# Patient Record
Sex: MALE | Race: WHITE | URBAN - METROPOLITAN AREA
[De-identification: names, ages, dates, MRNs, and addresses within clinical notes are randomized per-mention and may not be internally consistent; named-entity substitution may affect disease eponyms.]

---

## 2021-06-07 ENCOUNTER — VIRTUAL VISIT (OUTPATIENT)
Dept: INFECTIOUS DISEASES | Age: 81
End: 2021-06-07
Payer: MEDICARE

## 2021-06-07 DIAGNOSIS — I96 GANGRENE OF LEFT FOOT (HCC): Primary | ICD-10-CM

## 2021-06-07 PROCEDURE — 99213 OFFICE O/P EST LOW 20 MIN: CPT | Performed by: INTERNAL MEDICINE

## 2021-06-07 NOTE — PROGRESS NOTES
Caesar Esquivel (:  1940) is a 79 y.o. male,Established patient, here for evaluation of the following chief complaint(s): No chief complaint on file. ASSESSMENT/PLAN:   L foot gangrene    Agree with Angiogram and revascularization  Continue IV Zosyn x 3 more weeks  F/U in 3 weeks    SUBJECTIVE/OBJECTIVE:  HPI  F/U [facility] for L foot gangrene, on IV Zosyn at [facility]. L toes are black, no pain, has neuropathy. No fevers. Review of Systems   Skin: Positive for wound. All other systems reviewed and are negative.         Physical Exam    [INSTRUCTIONS:  \"[x]\" Indicates a positive item  \"[]\" Indicates a negative item  -- DELETE ALL ITEMS NOT EXAMINED]    Constitutional: [x] Appears well-developed and well-nourished [x] No apparent distress      [] Abnormal -     Mental status: [x] Alert and awake  [x] Oriented to person/place/time [x] Able to follow commands    [] Abnormal -     Eyes:   EOM    [x]  Normal    [] Abnormal -   Sclera  [x]  Normal    [] Abnormal -          Discharge [x]  None visible   [] Abnormal -     HENT: [x] Normocephalic, atraumatic  [] Abnormal -   [x] Mouth/Throat: Mucous membranes are moist    External Ears [x] Normal  [] Abnormal -    Neck: [x] No visualized mass [] Abnormal -     Pulmonary/Chest: [x] Respiratory effort normal   [x] No visualized signs of difficulty breathing or respiratory distress        [] Abnormal -      Musculoskeletal:   [x] Normal gait with no signs of ataxia         [x] Normal range of motion of neck        [] Abnormal -     Neurological:        [x] No Facial Asymmetry (Cranial nerve 7 motor function) (limited exam due to video visit)          [x] No gaze palsy        [] Abnormal -          Skin:        [x] No significant exanthematous lesions or discoloration noted on facial skin         [] Abnormal -            Psychiatric:       [x] Normal Affect [] Abnormal -        [x] No Hallucinations    Other pertinent observable physical exam findings:-  L foot with dry black toes, no erythema or drainage  Photo was obtained with pt permission      CRP<1    On this date 6/7/2021 I have spent 20 minutes reviewing previous notes, test results and face to face (virtual) with the patient discussing the diagnosis and importance of compliance with the treatment plan as well as documenting on the day of the visit. Owen Bello, was evaluated through a synchronous (real-time) audio-video encounter. The patient (or guardian if applicable) is aware that this is a billable service. Verbal consent to proceed has been obtained within the past 12 months. The visit was conducted pursuant to the emergency declaration under the [facility], [address] [place] [address] authority and the [employer] Act. Patient identification was verified, and a caregiver was present when appropriate. The patient was located in a state where the provider was credentialed to provide care. An electronic signature was used to authenticate this note.     --Daryle Mai, MD

## 2021-07-01 ENCOUNTER — TELEPHONE (OUTPATIENT)
Dept: INFECTIOUS DISEASES | Age: 81
End: 2021-07-01

## 2021-07-01 ENCOUNTER — VIRTUAL VISIT (OUTPATIENT)
Dept: INFECTIOUS DISEASES | Age: 81
End: 2021-07-01
Payer: MEDICARE

## 2021-07-01 DIAGNOSIS — L60.9 NAIL ABNORMALITIES: ICD-10-CM

## 2021-07-01 DIAGNOSIS — I96 GANGRENE OF LEFT FOOT (HCC): Primary | ICD-10-CM

## 2021-07-01 PROCEDURE — 99214 OFFICE O/P EST MOD 30 MIN: CPT | Performed by: INTERNAL MEDICINE

## 2021-07-01 NOTE — TELEPHONE ENCOUNTER
Follow up call to [facility] to confirm Dr José Allen orders. Ok to D/c IV Abx+Picc at [facility]. Patient to F/u with Dr Joana Dandy, Vascular surgeon.     For ID Progress note, Fax to [phone]

## 2021-07-01 NOTE — PROGRESS NOTES
2021    TELEHEALTH EVALUATION -- Audio/Visual (During [id number]-[id number] public health emergency)      Liya Yarbrough (:  1940) has requested an audio/video evaluation for the following concern(s):     L foot gangrene    Due to the COVID-19 outbreak, patient's visit was converted to a virtual visit. Patient agreed to proceed with a virtual visit with me via Doxy. me with my location at the office. Patient reports their location. The risks and benefits of converting to a virtual visit were discussed in light of the current infectious disease epidemic. Services were provided through an audio/video synchronous discussion virtually to substitute for in person clinic visit. THIS virtual visit was conducted via interactive/real-time audio/video. Due to this being a TeleHealth encounter, evaluation of the following organ systems is limited: Vitals/Constitutional/EENT/Resp/CV/GI//MS/Neuro/Skin/Heme-Lymph-Imm.}    Pursuant to the emergency declaration under the [facility], [facility] waiver authority and the [employer] Act, this Virtual Visit was conducted, with patient's consent, to reduce the patient's risk of exposure to COVID-19 and provide care for the patient. Infectious Disease Progress Note    2021    Patient is a followup regarding L foot gangrene in the setting of peripheral vascular disease. Dr Sylwia Berman had evaluated the patient for possible revascularization. Was not able to revascularize the foot but patient has developed collateral circulation. Finishing his course of Zosyn right now. Sees Dr Stratton Bring tomorrow for podiatry. No pain or fevers. No chills. Nurse at bedside to assist in exam  Toes L foot are blackened but not painful. There is not any drainage and no open wounds. Daughter in law is also present with the nurse for exam. No erythema per DIL.      The other foot has a toenail that is very long and curled that will need attention. No open wounds. .   Subjectively, no new complaints at this time. Since we cannot conduct an in-person exam, the following were addressed with the patient to the best of my capability via virtual visit:   Patient does not perceive any new visual deficits  No diaphoresis or flushing in the face  Patient is able to flex and extend her neck with ease. Patient can inhale and exhale without any difficulty and chest seems to be expanding symmetrically. No conversational dyspnea. Patient does not feel any palpitations   Patient's  abdomen is not protruberant beyond their normal size. No observed neurological changes or slurred speech when speaking to the patient      Feet as above. WBC trends are being monitored. Antibiotic doses are being adjusted per most recent renal labs. ASSESSMENT:  L foot gangrene - dry. Long toe nails      PLAN:  He is at the end of therapy. [place] PICC   Saw vascular  - collateral circulation to facilitate healing. Was not able to open up. Follows with Dr Uri Orr and will follow up with vascular as directed. Finished IV Zosyn at this point. Discussed with family at bedside and nurse. Photo uploaded from visit today    Imaging and labs were reviewed per medical records and any ID pertinent labs were also addressed  Time spent today in combination of reviewing patient's chart, medications, labs, pictures when pertinent, provider communication as necessary, counseling patient, care/coordination not otherwise reported here, and patient face to face virtual visit 30 min.   >50% of that time spent counseling and coordination of patient care  Addressed preventive measures such as vaccinations, the importance of annual exam with the PCP, and the importance of health maintenance by dietary and exercise regimens. All questions were answered from an ID perspective and to the best of my ability.       Social distancing measures, the importance of face

## 2023-09-08 VITALS
HEIGHT: 72 IN | BODY MASS INDEX: 24.43 KG/M2 | RESPIRATION RATE: 18 BRPM | TEMPERATURE: 97.3 F | DIASTOLIC BLOOD PRESSURE: 66 MMHG | HEART RATE: 70 BPM | WEIGHT: 180.34 LBS | SYSTOLIC BLOOD PRESSURE: 115 MMHG